# Patient Record
Sex: FEMALE | Race: WHITE
[De-identification: names, ages, dates, MRNs, and addresses within clinical notes are randomized per-mention and may not be internally consistent; named-entity substitution may affect disease eponyms.]

---

## 2017-03-11 ENCOUNTER — HOSPITAL ENCOUNTER (EMERGENCY)
Dept: HOSPITAL 84 - D.ER | Age: 82
Discharge: HOME | End: 2017-03-11
Payer: MEDICARE

## 2017-03-11 VITALS — BODY MASS INDEX: 21.7 KG/M2

## 2017-03-11 DIAGNOSIS — Z86.73: ICD-10-CM

## 2017-03-11 DIAGNOSIS — R41.82: Primary | ICD-10-CM

## 2017-03-11 LAB
ANION GAP SERPL CALC-SCNC: 15.3 MMOL/L (ref 8–16)
APPEARANCE UR: CLEAR
BACTERIA #/AREA URNS HPF: (no result) /HPF
BASOPHILS NFR BLD AUTO: 0.1 % (ref 0–2)
BILIRUB SERPL-MCNC: NEGATIVE MG/DL
BUN SERPL-MCNC: 17 MG/DL (ref 7–18)
CALCIUM SERPL-MCNC: 9.2 MG/DL (ref 8.5–10.1)
CHLORIDE SERPL-SCNC: 106 MMOL/L (ref 98–107)
CO2 SERPL-SCNC: 25.2 MMOL/L (ref 21–32)
COLOR UR: YELLOW
CREAT SERPL-MCNC: 0.9 MG/DL (ref 0.6–1.3)
EOSINOPHIL NFR BLD: 14.3 % (ref 0–7)
ERYTHROCYTE [DISTWIDTH] IN BLOOD BY AUTOMATED COUNT: 13.7 % (ref 11.5–14.5)
GLUCOSE SERPL-MCNC: 125 MG/DL (ref 74–106)
GLUCOSE SERPL-MCNC: NEGATIVE MG/DL
HCT VFR BLD CALC: 38.1 % (ref 36–48)
HGB BLD-MCNC: 12.3 G/DL (ref 12–16)
IMM GRANULOCYTES NFR BLD: 0.4 % (ref 0–5)
KETONES UR STRIP-MCNC: NEGATIVE MG/DL
LEUKOCYTE ESTERASE: (no result)
LYMPHOCYTES NFR BLD AUTO: 12 % (ref 15–50)
MCH RBC QN AUTO: 31.3 PG (ref 26–34)
MCHC RBC AUTO-ENTMCNC: 32.3 G/DL (ref 31–37)
MCV RBC: 96.9 FL (ref 80–100)
MONOCYTES NFR BLD: 7.5 % (ref 2–11)
NEUTROPHILS NFR BLD AUTO: 65.7 % (ref 40–80)
NITRITE UR-MCNC: NEGATIVE MG/ML
OSMOLALITY SERPL CALC.SUM OF ELEC: 287 MOSM/KG (ref 275–300)
PH UR STRIP: 7 [PH] (ref 5–6)
PLATELET # BLD: 226 10X3/UL (ref 130–400)
PMV BLD AUTO: 10.2 FL (ref 7.4–10.4)
POTASSIUM SERPL-SCNC: 3.5 MMOL/L (ref 3.5–5.1)
PROT UR-MCNC: NEGATIVE MG/DL
RBC # BLD AUTO: 3.93 10X6/UL (ref 4–5.4)
RBC #/AREA URNS HPF: (no result) /HPF (ref 0–5)
SODIUM SERPL-SCNC: 143 MMOL/L (ref 136–145)
SP GR UR STRIP: 1.01 (ref 1–1.02)
SQUAMOUS #/AREA URNS HPF: (no result) /HPF (ref 0–5)
UROBILINOGEN UR-MCNC: NORMAL MG/DL
WBC # BLD AUTO: 12.2 10X3/UL (ref 4.8–10.8)
WBC #/AREA URNS HPF: (no result) /HPF (ref 0–5)

## 2017-03-13 ENCOUNTER — HOSPITAL ENCOUNTER (INPATIENT)
Dept: HOSPITAL 84 - D.ER | Age: 82
LOS: 4 days | Discharge: NURSING, REHAB OR LONG-TERM CARE | DRG: 552 | End: 2017-03-17
Attending: FAMILY MEDICINE | Admitting: FAMILY MEDICINE
Payer: MEDICARE

## 2017-03-13 VITALS
BODY MASS INDEX: 22.18 KG/M2 | WEIGHT: 141.3 LBS | HEIGHT: 67 IN | BODY MASS INDEX: 22.18 KG/M2 | HEIGHT: 67 IN | BODY MASS INDEX: 22.18 KG/M2 | WEIGHT: 141.3 LBS

## 2017-03-13 VITALS — SYSTOLIC BLOOD PRESSURE: 161 MMHG | DIASTOLIC BLOOD PRESSURE: 89 MMHG

## 2017-03-13 VITALS — DIASTOLIC BLOOD PRESSURE: 50 MMHG | SYSTOLIC BLOOD PRESSURE: 116 MMHG

## 2017-03-13 VITALS — SYSTOLIC BLOOD PRESSURE: 116 MMHG | DIASTOLIC BLOOD PRESSURE: 50 MMHG

## 2017-03-13 DIAGNOSIS — I25.10: ICD-10-CM

## 2017-03-13 DIAGNOSIS — I42.9: ICD-10-CM

## 2017-03-13 DIAGNOSIS — M54.16: ICD-10-CM

## 2017-03-13 DIAGNOSIS — I11.0: ICD-10-CM

## 2017-03-13 DIAGNOSIS — F03.90: ICD-10-CM

## 2017-03-13 DIAGNOSIS — Z98.61: ICD-10-CM
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
## 2017-03-13 DIAGNOSIS — S32.059A: Primary | ICD-10-CM

## 2017-03-13 DIAGNOSIS — Z95.811: ICD-10-CM

## 2017-03-13 DIAGNOSIS — I50.9: ICD-10-CM

## 2017-03-13 LAB
ALBUMIN SERPL-MCNC: 3.1 G/DL (ref 3.4–5)
ALP SERPL-CCNC: 76 U/L (ref 46–116)
ALT SERPL-CCNC: 20 U/L (ref 10–68)
ANION GAP SERPL CALC-SCNC: 12.9 MMOL/L (ref 8–16)
BASOPHILS NFR BLD AUTO: 0.3 % (ref 0–2)
BILIRUB SERPL-MCNC: 0.83 MG/DL (ref 0.2–1.3)
BUN SERPL-MCNC: 12 MG/DL (ref 7–18)
CALCIUM SERPL-MCNC: 9.1 MG/DL (ref 8.5–10.1)
CHLORIDE SERPL-SCNC: 106 MMOL/L (ref 98–107)
CK SERPL-CCNC: 123 UL (ref 21–215)
CO2 SERPL-SCNC: 26.9 MMOL/L (ref 21–32)
CREAT SERPL-MCNC: 0.9 MG/DL (ref 0.6–1.3)
EOSINOPHIL NFR BLD: 14.4 % (ref 0–7)
ERYTHROCYTE [DISTWIDTH] IN BLOOD BY AUTOMATED COUNT: 13.3 % (ref 11.5–14.5)
GLOBULIN SER-MCNC: 3.9 G/L
GLUCOSE SERPL-MCNC: 103 MG/DL (ref 74–106)
HCT VFR BLD CALC: 35.7 % (ref 36–48)
HGB BLD-MCNC: 11.7 G/DL (ref 12–16)
IMM GRANULOCYTES NFR BLD: 0.2 % (ref 0–5)
LYMPHOCYTES NFR BLD AUTO: 17.7 % (ref 15–50)
MCH RBC QN AUTO: 31.3 PG (ref 26–34)
MCHC RBC AUTO-ENTMCNC: 32.8 G/DL (ref 31–37)
MCV RBC: 95.5 FL (ref 80–100)
MONOCYTES NFR BLD: 7.3 % (ref 2–11)
NEUTROPHILS NFR BLD AUTO: 60.1 % (ref 40–80)
NT-PROBNP SERPL-MCNC: 1021 PG/ML (ref 0–450)
OSMOLALITY SERPL CALC.SUM OF ELEC: 282 MOSM/KG (ref 275–300)
PLATELET # BLD: 197 10X3/UL (ref 130–400)
PMV BLD AUTO: 10 FL (ref 7.4–10.4)
POTASSIUM SERPL-SCNC: 3.8 MMOL/L (ref 3.5–5.1)
PROT SERPL-MCNC: 7 G/DL (ref 6.4–8.2)
RBC # BLD AUTO: 3.74 10X6/UL (ref 4–5.4)
SODIUM SERPL-SCNC: 142 MMOL/L (ref 136–145)
TROPONIN I SERPL-MCNC: 0.06 NG/ML (ref 0–0.06)
WBC # BLD AUTO: 6.5 10X3/UL (ref 4.8–10.8)

## 2017-03-13 NOTE — NUR
REPORT RECIVED FROM FELICIA MCKEON IN ER.  PT TO BE RECIVED TO ROOM 2140 AND PT TO
HAVE MRI COMPLETED ONCE ARRIVED TO FLOOR REPORTED TO THIS NURSE THAT IV 18G TO
LEFT AC AND 1000MG TYLENOL GIVEN IN ER FOR PAIN.  WILL ASSESS PT ONCE ARRIVES
TO FLOOR

## 2017-03-13 NOTE — NUR
RECEIVED REPORT, IV-LAC-ISMA, [id number], BED IS LOW, SRX2, EXPLAINED HOW TO
USE CALL LIGHT, CALL LIGHT IN REACH, WILL CONTINUE TO MONITOR

## 2017-03-13 NOTE — NUR
PT ARRIVED FROM ER TO ROOM 2140 BY WHEELCHAIR FAMILY IN ROOM AT BEDSIDE HX
COMPLETED AND MEDICATIONS REVIEWED FAMILY REPORTED THAT PT " HAS NOT TAKEN ANY
OF HER MEDICATIONS IN ABOUT A MOUNTH OR SO AND I DONT KNOW WHAT SHE IS ALL ON
AMOXICILLIN SHE TOOK YESTERDAY AND THAT WAS THE LAST TIME SHE TOOK ANY MEDS"
YUNIER FOX NOTIFIED OF PT ARRIVAL AND PT INFORMED THAT SHE IS TO BE TAKEN TO
MRI  RESPERATIONS EVEN AND UNLABORED WITH NO DISTRESS OBSERVED BED LOW AND
LOKCED CALL LIGHT IN REACH SRX 2 BED ALARM IN AND WORKING WILL MONITOR

## 2017-03-14 VITALS — DIASTOLIC BLOOD PRESSURE: 73 MMHG | SYSTOLIC BLOOD PRESSURE: 121 MMHG

## 2017-03-14 VITALS — SYSTOLIC BLOOD PRESSURE: 139 MMHG | DIASTOLIC BLOOD PRESSURE: 63 MMHG

## 2017-03-14 VITALS — SYSTOLIC BLOOD PRESSURE: 136 MMHG | DIASTOLIC BLOOD PRESSURE: 64 MMHG

## 2017-03-14 VITALS — SYSTOLIC BLOOD PRESSURE: 159 MMHG | DIASTOLIC BLOOD PRESSURE: 73 MMHG

## 2017-03-14 VITALS — DIASTOLIC BLOOD PRESSURE: 70 MMHG | SYSTOLIC BLOOD PRESSURE: 132 MMHG

## 2017-03-14 LAB
ALBUMIN SERPL-MCNC: 3 G/DL (ref 3.4–5)
ALP SERPL-CCNC: 68 U/L (ref 46–116)
ALT SERPL-CCNC: 11 U/L (ref 10–68)
ANION GAP SERPL CALC-SCNC: 11.7 MMOL/L (ref 8–16)
BASOPHILS NFR BLD AUTO: 0.3 % (ref 0–2)
BILIRUB SERPL-MCNC: 0.78 MG/DL (ref 0.2–1.3)
BUN SERPL-MCNC: 12 MG/DL (ref 7–18)
CALCIUM SERPL-MCNC: 8.7 MG/DL (ref 8.5–10.1)
CHLORIDE SERPL-SCNC: 106 MMOL/L (ref 98–107)
CO2 SERPL-SCNC: 25.8 MMOL/L (ref 21–32)
CREAT SERPL-MCNC: 0.7 MG/DL (ref 0.6–1.3)
EOSINOPHIL NFR BLD: 7.6 % (ref 0–7)
ERYTHROCYTE [DISTWIDTH] IN BLOOD BY AUTOMATED COUNT: 13.1 % (ref 11.5–14.5)
GLOBULIN SER-MCNC: 3.7 G/L
GLUCOSE SERPL-MCNC: 97 MG/DL (ref 74–106)
HCT VFR BLD CALC: 35.6 % (ref 36–48)
HGB BLD-MCNC: 11.5 G/DL (ref 12–16)
IMM GRANULOCYTES NFR BLD: 0.1 % (ref 0–5)
LYMPHOCYTES NFR BLD AUTO: 20.8 % (ref 15–50)
MCH RBC QN AUTO: 30.7 PG (ref 26–34)
MCHC RBC AUTO-ENTMCNC: 32.3 G/DL (ref 31–37)
MCV RBC: 94.9 FL (ref 80–100)
MONOCYTES NFR BLD: 9.2 % (ref 2–11)
NEUTROPHILS NFR BLD AUTO: 62 % (ref 40–80)
OSMOLALITY SERPL CALC.SUM OF ELEC: 278 MOSM/KG (ref 275–300)
PLATELET # BLD: 202 10X3/UL (ref 130–400)
PMV BLD AUTO: 11.1 FL (ref 7.4–10.4)
POTASSIUM SERPL-SCNC: 3.5 MMOL/L (ref 3.5–5.1)
PROT SERPL-MCNC: 6.7 G/DL (ref 6.4–8.2)
RBC # BLD AUTO: 3.75 10X6/UL (ref 4–5.4)
SODIUM SERPL-SCNC: 140 MMOL/L (ref 136–145)
WBC # BLD AUTO: 6.9 10X3/UL (ref 4.8–10.8)

## 2017-03-14 NOTE — NUR
ASSIST TO BEDSIDE COMMODE, PLACED BACK IN BED, CALL LIGHT IN REACH, BED ALARM
ON, WILL CONTINUE TO MONITOR

## 2017-03-14 NOTE — NUR
PT RESTING IN BED. DAUGHTER AT BEDSIDE. ALERT/ORIENTED WITH SOME INTERMITENT
CONFUSION/FORGETFULNESS. SALINE LOCK TO LEFT A/C. SR PER TELEMETRY. FALL
PRECAUTIONS/BED ALARM ON. SEE COMPLETED SHIFT ASSESSMENT. CPOC.

## 2017-03-14 NOTE — HP
PATIENT: KEITH TOMAS                                   MEDICAL RECORD: [id number]
ACCOUNT: [id number]                                    LOCATION:[facility]    D.2140
: 31                                            ADMISSION DATE: 17
                                                         
 
                             HISTORY AND PHYSICAL EXAMINATION
 
 
HISTORY OF PRESENT ILLNESS:  Ms. Tomas is an 85-year-old patient, who have
healthy connections are presents after having a fall.  She lives alone with her
sister.  She denies any tripping or dizziness.  She is wearing a LifeVest, which
apparently fired.  She is accompanied by her daughter today.  Dr. Rose is
her cardiologist.  She is complaining of low back pain, worse since her fall.
 
PAST MEDICAL HISTORY:  Significant for dementia, myocardial infarction in ____
year.  Since that time, she has been wearing a LifeVest.  She apparently had
some arrhythmias afterwards, also has hypertension.
 
PREVIOUS SURGERIES:  Include gallbladder.
 
ALLERGIES:  None known.
 
HOME MEDICATIONS:  She was taking some amoxicillin recently.
 
FAMILY HISTORY:  Noncontributory.
 
SOCIAL HISTORY:  She is not a smoker.  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
in November.  She has been staying with her sister here in [place].
 
REVIEW OF SYSTEMS:  She has had some memory issues, some weakness in her recent
fall.  She denies any chest pain, any dizziness, any palpitations.  She denies
any shortness of breath.  She denies any change in bladder or bowel function. 
She is complaining of back pain.
 
PHYSICAL EXAMINATION:
HEENT:  Head is normocephalic, sclerae nonicteric.
NECK:  Soft and supple.
HEART:  Regular.
LUNGS:  Clear.
ABDOMEN:  Soft.  No rebound, no guarding, no mass.
NEUROLOGIC:  Without any gross focal deficits.
 
IMPRESSIONS:
1.  Apparent firing of LifeVest.
2.  Low back pain, worrisome for compression fracture, dementia and coronary
artery disease with myocardial infarction approximately 4 months ago, cardiac
arrhythmia.
 
PLAN:  Place her on telemetry.  We will get MRI of lumbar spine.  Consult
cardiology.  LifeVest, people have already been here to interrogate Vest and
were still waiting on what that showed.  Telemetry to see orders for rest of the
plan.
 
TRANSINT:[id number] Voice Confirmation ID: [id number] DOCUMENT ID: [id number]
 
 
 
 
 
HISTORY AND PHYSICAL                           [id number]    KEITH TOMAS MATTHEW DO            
 
 
 
Electronically Signed by ELISE LARSEN on 17 at 1523
 
 
 
 
 
 
 
 
 
 
 
 
 
 
 
 
 
 
 
 
 
 
 
 
 
 
 
 
 
 
 
 
 
 
 
 
 
 
 
 
 
CC:                                                             [id number]
DICTATION DATE: 17 164     :     17 1908      ADM IN  
                                                                              
[facility]                                          
[address] [id number]

## 2017-03-14 NOTE — NUR
FREQUENTLY IN ROOM TO RESET BED ALARM SINCE PT IS PRONE TO SIT ON SIDE OF BED
AND "JUST BE". CLOSE SUPERVISION.

## 2017-03-15 VITALS — SYSTOLIC BLOOD PRESSURE: 110 MMHG | DIASTOLIC BLOOD PRESSURE: 62 MMHG

## 2017-03-15 VITALS — SYSTOLIC BLOOD PRESSURE: 102 MMHG | DIASTOLIC BLOOD PRESSURE: 71 MMHG

## 2017-03-15 VITALS — SYSTOLIC BLOOD PRESSURE: 110 MMHG | DIASTOLIC BLOOD PRESSURE: 58 MMHG

## 2017-03-15 VITALS — DIASTOLIC BLOOD PRESSURE: 69 MMHG | SYSTOLIC BLOOD PRESSURE: 117 MMHG

## 2017-03-15 VITALS — DIASTOLIC BLOOD PRESSURE: 84 MMHG | SYSTOLIC BLOOD PRESSURE: 152 MMHG

## 2017-03-15 VITALS — SYSTOLIC BLOOD PRESSURE: 111 MMHG | DIASTOLIC BLOOD PRESSURE: 69 MMHG

## 2017-03-15 LAB
ALBUMIN SERPL-MCNC: 3 G/DL (ref 3.4–5)
ALP SERPL-CCNC: 73 U/L (ref 46–116)
ALT SERPL-CCNC: 16 U/L (ref 10–68)
ANION GAP SERPL CALC-SCNC: 12.7 MMOL/L (ref 8–16)
BASOPHILS NFR BLD AUTO: 0.1 % (ref 0–2)
BILIRUB SERPL-MCNC: 0.51 MG/DL (ref 0.2–1.3)
BUN SERPL-MCNC: 13 MG/DL (ref 7–18)
CALCIUM SERPL-MCNC: 8.8 MG/DL (ref 8.5–10.1)
CHLORIDE SERPL-SCNC: 107 MMOL/L (ref 98–107)
CO2 SERPL-SCNC: 25.8 MMOL/L (ref 21–32)
CREAT SERPL-MCNC: 0.7 MG/DL (ref 0.6–1.3)
EOSINOPHIL NFR BLD: 8.1 % (ref 0–7)
ERYTHROCYTE [DISTWIDTH] IN BLOOD BY AUTOMATED COUNT: 13.4 % (ref 11.5–14.5)
GLOBULIN SER-MCNC: 3.6 G/L
GLUCOSE SERPL-MCNC: 112 MG/DL (ref 74–106)
HCT VFR BLD CALC: 34.6 % (ref 36–48)
HGB BLD-MCNC: 11.3 G/DL (ref 12–16)
IMM GRANULOCYTES NFR BLD: 0.1 % (ref 0–5)
LYMPHOCYTES NFR BLD AUTO: 20.4 % (ref 15–50)
MCH RBC QN AUTO: 30.9 PG (ref 26–34)
MCHC RBC AUTO-ENTMCNC: 32.7 G/DL (ref 31–37)
MCV RBC: 94.5 FL (ref 80–100)
MONOCYTES NFR BLD: 12.9 % (ref 2–11)
NEUTROPHILS NFR BLD AUTO: 58.4 % (ref 40–80)
OSMOLALITY SERPL CALC.SUM OF ELEC: 283 MOSM/KG (ref 275–300)
PLATELET # BLD: 206 10X3/UL (ref 130–400)
PMV BLD AUTO: 10.4 FL (ref 7.4–10.4)
POTASSIUM SERPL-SCNC: 3.5 MMOL/L (ref 3.5–5.1)
PROT SERPL-MCNC: 6.6 G/DL (ref 6.4–8.2)
RBC # BLD AUTO: 3.66 10X6/UL (ref 4–5.4)
SODIUM SERPL-SCNC: 142 MMOL/L (ref 136–145)
WBC # BLD AUTO: 7.1 10X3/UL (ref 4.8–10.8)

## 2017-03-15 NOTE — NUR
DISCONTINUED PT'S ORDERED MORPHINE AT THIS TIME DUE TO FAMILY STATES PT
BECOMES VERY CONFUSED/AGITATED/RESTLESS AND DIFFICULT TO HANDLE WHEN GIVEN
MORPHINE. LISTED NOW AS ADR FOR PATIENT.

## 2017-03-15 NOTE — NUR
PT GEORGE/GINGER TOMAS CALLED TO  ADVISE NOT TO PROCEED WITH BACK SURGERY AND OK TO
SEND TO REHAB. I ADVISED I WOULD RELAY THIS INFORMATION TO YUNIER/MARK WITH
DR. PHILLIPS. SHE STATED SHE COULD BE REACHED [phone] IF NEEDED FOR
FURTHER QUESTIONS.

## 2017-03-15 NOTE — NUR
Given Tylenol with codeine tab by another clinician as per MD orders for back
pain rated 10/10.  Patient states she fell 2 weeks ago and hurt her back.
Also given Melatonin 3mg po PRN for sleep.

## 2017-03-15 NOTE — NUR
Received patient sitting up in bed visiting with daughter at bedside, bed
alarm on, Left AC PIV is SL, telemetry monitor on, rhythm is SR with
occasional PVCs.   Oriented to person and place at this time, somewhat
anxious.   Will check for antianxiety medication available.

## 2017-03-15 NOTE — NUR
ASSISTED UP TO USE BEDSIDE COMMODE. PT VOIDED THEN ASSISTED BACK TO BED. PT
HAS BEEN AWAKE MOST OF THE NIGHT, YET TELLS NURSE "WELL I SURE HAVE BEEN
ASLEEP ALOT". VERY POOR SHORT TERM RECALL.

## 2017-03-16 VITALS — SYSTOLIC BLOOD PRESSURE: 116 MMHG | DIASTOLIC BLOOD PRESSURE: 68 MMHG

## 2017-03-16 VITALS — DIASTOLIC BLOOD PRESSURE: 69 MMHG | SYSTOLIC BLOOD PRESSURE: 107 MMHG

## 2017-03-16 VITALS — DIASTOLIC BLOOD PRESSURE: 70 MMHG | SYSTOLIC BLOOD PRESSURE: 109 MMHG

## 2017-03-16 VITALS — DIASTOLIC BLOOD PRESSURE: 73 MMHG | SYSTOLIC BLOOD PRESSURE: 114 MMHG

## 2017-03-16 VITALS — SYSTOLIC BLOOD PRESSURE: 113 MMHG | DIASTOLIC BLOOD PRESSURE: 46 MMHG

## 2017-03-16 VITALS — DIASTOLIC BLOOD PRESSURE: 67 MMHG | SYSTOLIC BLOOD PRESSURE: 153 MMHG

## 2017-03-16 LAB
ALBUMIN SERPL-MCNC: 2.8 G/DL (ref 3.4–5)
ALP SERPL-CCNC: 69 U/L (ref 46–116)
ALT SERPL-CCNC: 16 U/L (ref 10–68)
ANION GAP SERPL CALC-SCNC: 11.7 MMOL/L (ref 8–16)
BASOPHILS NFR BLD AUTO: 0.1 % (ref 0–2)
BILIRUB SERPL-MCNC: 0.5 MG/DL (ref 0.2–1.3)
BUN SERPL-MCNC: 17 MG/DL (ref 7–18)
CALCIUM SERPL-MCNC: 8.4 MG/DL (ref 8.5–10.1)
CHLORIDE SERPL-SCNC: 104 MMOL/L (ref 98–107)
CO2 SERPL-SCNC: 26.6 MMOL/L (ref 21–32)
CREAT SERPL-MCNC: 0.8 MG/DL (ref 0.6–1.3)
EOSINOPHIL NFR BLD: 11.9 % (ref 0–7)
ERYTHROCYTE [DISTWIDTH] IN BLOOD BY AUTOMATED COUNT: 13.5 % (ref 11.5–14.5)
GLOBULIN SER-MCNC: 3.3 G/L
GLUCOSE SERPL-MCNC: 109 MG/DL (ref 74–106)
HCT VFR BLD CALC: 33.3 % (ref 36–48)
HGB BLD-MCNC: 10.9 G/DL (ref 12–16)
IMM GRANULOCYTES NFR BLD: 0.1 % (ref 0–5)
LYMPHOCYTES NFR BLD AUTO: 23.8 % (ref 15–50)
MCH RBC QN AUTO: 31.1 PG (ref 26–34)
MCHC RBC AUTO-ENTMCNC: 32.7 G/DL (ref 31–37)
MCV RBC: 94.9 FL (ref 80–100)
MONOCYTES NFR BLD: 11.3 % (ref 2–11)
NEUTROPHILS NFR BLD AUTO: 52.8 % (ref 40–80)
OSMOLALITY SERPL CALC.SUM OF ELEC: 280 MOSM/KG (ref 275–300)
PLATELET # BLD: 224 10X3/UL (ref 130–400)
PMV BLD AUTO: 10.3 FL (ref 7.4–10.4)
POTASSIUM SERPL-SCNC: 3.3 MMOL/L (ref 3.5–5.1)
PROT SERPL-MCNC: 6.1 G/DL (ref 6.4–8.2)
RBC # BLD AUTO: 3.51 10X6/UL (ref 4–5.4)
SODIUM SERPL-SCNC: 139 MMOL/L (ref 136–145)
WBC # BLD AUTO: 6.8 10X3/UL (ref 4.8–10.8)

## 2017-03-16 NOTE — NUR
SPOKE WITH PATIENT, HER DAUGHTER, AND DR. PHILLIPS. PATIENT'S DAUGHTER WANTS HER
TO GO TO [facility] IN [place]. SHE STATES IT IS CLOSE TO HER
HOME AND IT WOULD BE EASIER FOR HER THAN HERE IN [place]. I PHONED ACUTE
REHAB AT [facility] [phone]. I HAVE FAXED HER INFORMATION TO [facility]
[phone]. WILL AWAIT RESPONSE.
I HAVE LET THE PATIENT AND HER DAUGHTER KNOW OF THE REFERRAL AND WILL LET THEM
KNOW WHEN WE HEAR BACK FROM THEM. CM TO FOLLOW.

## 2017-03-16 NOTE — NUR
PATIENT IS AWAKE AND ALERT, SHE RATES PAIN 5/10 ONLY IF SHE MOVES, BUT SHE
SAYS SHE IS OK RIGHT NOW.

## 2017-03-16 NOTE — NUR
Is the patient Alert and Oriented? Yes  0
* How many steps to enter\exit or inside your home? 5  0
* PCP DR. VOGEL IN [place]  0
* Pharmacy [facility]  0
* Preadmission Environment Home with Family  0
* ADLs Independent  0
* Equipment Walker  0
* List name and contact numbers for known caregivers / representatives who
currently or will assist patient after discharge: DAUGHTER: GINGER TOMAS
[phone]
SISTER: RUSLAN MEEK [phone]  0
* Community resources currently utilized None  0
* Additional services required to return to the preadmission environment? No
0
* Can the patient safely return to the preadmission environment? Yes  0
* Has this patient been hospitalized within the prior 30 days at any hospital?
No
PATIENT IS AWAKE AND ALERT. SHE STATES SHE LIVES AT HOME WITH HER SISTER, RUSLAN MEEK. SHE STATES THAT HER MD IS DR. VOGEL IN [place]. PATIENT GETS HER
MEDS FROM [facility]. SHE TELLS ME SHE HAS A WALKER SHE UTILIZES AT
HOME. PATIENT DENIES HOME HEALTH CARE. PATIENT UNSURE ABOUT SOME OF HER
ANSWERS AND STATES I NEED TO TALK TO HER DAUGHTER LATER THIS AFTERNOON.
PATIENT MAY BENEFIT FROM ACUTE REHAB. SHE STATES I SHOULD TALK TO HER DAUGHTER
ABOUT THAT.
PATIENT WAS GIVEN IMM AND EXPLAINED. PATIENT SIGNED AND STATED SHE WILL GIVE
IT TO HER DAUGHTER LATER TODAY. CM TO FOLLOW.

## 2017-03-16 NOTE — NUR
Given Tylenol #3 given for severe back pain and generalized aches and pains,
rated 10/10.  Extremely anxious, hyperventillating, having panic attack,
talked with patient for verbal 1:1.  Encouraged to take deep breaths and calm
behavior.  Assisted back into bed, had been sitting up.  Will continue to
monitor.

## 2017-03-16 NOTE — NUR
PRNs deemed effective, has been sleeping x 2 hours, respirations easy and
regular, no restlessness noted.

## 2017-03-16 NOTE — NUR
ASSISTED PATIENT TO BSC, SHE IS SOMEWHAT CONFUSED, BUT SHE DOES UNDERSTAND
ENOUGH NOT TO GET UP BY HERSELF DURING THE DAY.

## 2017-03-16 NOTE — NUR
WENT INTO PATIENT'S ROOM AND LOOKED AT THE COMPUTER SHE SAID "OH YOU CAN TURN
THAT OFF" BUT THEN SHE SAID "I DIDN'T WANT THE TV OFF". PATIENT HAS
INTERMITTANT CONFUSION.

## 2017-03-16 NOTE — NUR
RECEIVED MEDICATION LIST. PATIENT AND DAUGHTER SAY ONE OF THE MEDS GIVES HER
DIARRHEA, BUT SHE WAS PRESCRIBED BENTYL, UNSURE OF DOSE OR TIMES.

## 2017-03-17 VITALS — SYSTOLIC BLOOD PRESSURE: 138 MMHG | DIASTOLIC BLOOD PRESSURE: 77 MMHG

## 2017-03-17 VITALS — DIASTOLIC BLOOD PRESSURE: 64 MMHG | SYSTOLIC BLOOD PRESSURE: 129 MMHG

## 2017-03-17 VITALS — DIASTOLIC BLOOD PRESSURE: 69 MMHG | SYSTOLIC BLOOD PRESSURE: 123 MMHG

## 2017-03-17 VITALS — SYSTOLIC BLOOD PRESSURE: 128 MMHG | DIASTOLIC BLOOD PRESSURE: 70 MMHG

## 2017-03-17 LAB
ALBUMIN SERPL-MCNC: 2.8 G/DL (ref 3.4–5)
ALP SERPL-CCNC: 68 U/L (ref 46–116)
ALT SERPL-CCNC: 16 U/L (ref 10–68)
ANION GAP SERPL CALC-SCNC: 9.9 MMOL/L (ref 8–16)
APPEARANCE UR: CLEAR
BASOPHILS NFR BLD AUTO: 0.6 % (ref 0–2)
BILIRUB SERPL-MCNC: 0.39 MG/DL (ref 0.2–1.3)
BILIRUB SERPL-MCNC: NEGATIVE MG/DL
BUN SERPL-MCNC: 22 MG/DL (ref 7–18)
CALCIUM SERPL-MCNC: 8.4 MG/DL (ref 8.5–10.1)
CHLORIDE SERPL-SCNC: 107 MMOL/L (ref 98–107)
CO2 SERPL-SCNC: 28.1 MMOL/L (ref 21–32)
COLOR UR: YELLOW
CREAT SERPL-MCNC: 0.8 MG/DL (ref 0.6–1.3)
EOSINOPHIL NFR BLD: 16.4 % (ref 0–7)
ERYTHROCYTE [DISTWIDTH] IN BLOOD BY AUTOMATED COUNT: 13.8 % (ref 11.5–14.5)
GLOBULIN SER-MCNC: 3.4 G/L
GLUCOSE SERPL-MCNC: 98 MG/DL (ref 74–106)
GLUCOSE SERPL-MCNC: NEGATIVE MG/DL
HCT VFR BLD CALC: 32.8 % (ref 36–48)
HGB BLD-MCNC: 10.6 G/DL (ref 12–16)
IMM GRANULOCYTES NFR BLD: 0.1 % (ref 0–5)
KETONES UR STRIP-MCNC: NEGATIVE MG/DL
LEUKOCYTE ESTERASE: NEGATIVE
LYMPHOCYTES NFR BLD AUTO: 23.1 % (ref 15–50)
MCH RBC QN AUTO: 30.9 PG (ref 26–34)
MCHC RBC AUTO-ENTMCNC: 32.3 G/DL (ref 31–37)
MCV RBC: 95.6 FL (ref 80–100)
MONOCYTES NFR BLD: 11.2 % (ref 2–11)
NEUTROPHILS NFR BLD AUTO: 48.6 % (ref 40–80)
NITRITE UR-MCNC: NEGATIVE MG/ML
OSMOLALITY SERPL CALC.SUM OF ELEC: 283 MOSM/KG (ref 275–300)
PH UR STRIP: 5 [PH] (ref 5–6)
PLATELET # BLD: 234 10X3/UL (ref 130–400)
PMV BLD AUTO: 10.4 FL (ref 7.4–10.4)
POTASSIUM SERPL-SCNC: 4 MMOL/L (ref 3.5–5.1)
PROT SERPL-MCNC: 6.2 G/DL (ref 6.4–8.2)
PROT UR-MCNC: NEGATIVE MG/DL
RBC # BLD AUTO: 3.43 10X6/UL (ref 4–5.4)
SODIUM SERPL-SCNC: 141 MMOL/L (ref 136–145)
SP GR UR STRIP: 1.01 (ref 1–1.02)
UROBILINOGEN UR-MCNC: NORMAL MG/DL
WBC # BLD AUTO: 6.7 10X3/UL (ref 4.8–10.8)

## 2017-03-17 NOTE — NUR
3/17/2017 10:50 DCP: Discharge Planning Referral as sent yesterday to [facility] in [place] yesterday afternoon by GRISELDA Rosas (Not
[facility]). Rec'd call from Claritza with [facility] - they have accepted patient
& will transport via facility van today at 1300. Patient will transfer to a
skilled nursing bed. Nursing to call report to [phone], 2nd Floor.
Spoke with daughter, Shy Colbert via telephone. She is agreeable to POC. She
states she will meet patient at the facility to complete admission paperwork.
Answered questions.
 
[facility]
[address] [facility], [place]
[phone]
 
[place]
[phone] - Cell
[phone] - Fax

## 2017-03-17 NOTE — NUR
3/17/2017 10:50 DCP: Discharge Planning Referral as sent yesterday to [facility] in [place] yesterday afternoon by GRISELDA Rosas (Not
[facility]). Rec'd call from Claritza with [facility] - they have accepted patient
& will transport via facility van today at 1300. Patient will transfer to a
skilled nursing bed. Nursing to call report to [phone], 2nd Floor.
Spoke with daughter, Shy Colbert via telephone. She is agreeable to POC. She
states she will meet patient at the facility to complete admission paperwork.
Answered questions.
 
[facility] [facility]
[address] [facility], [place]
[phone]
 
[place]
[phone] - Cell
[phone] - Fax

## 2017-03-17 NOTE — NUR
PT D/C'D TO REHAB FACILITY VIA FACILITY TRANSPORTATION. CALLED PT'S DAUGHTER
GINGER AND NOTIFIED HER OF PT'S DEPARTURE. PT'S BELONGINGS WENT WITH PT.

## 2017-03-17 NOTE — NUR
ASSESSMENT DONE. PT SITTING UP ON SIDE OF BED, READING NEWS PAPER. A/O X3.
DENIES PAIN OR NEEDS AT THIS TIME. NO DISTRESS NOTED. CALL LIGHT WITH IN
REACH. WILL CONT. TO MONITOR.

## 2017-03-17 NOTE — NUR
NURSE WENT OVER D/C INSTRUCTIONS WITH PT. IV REMOVED, AND TELEMETRY REMOVED.
NURSE ASSITED PT WITH DRESSING, AND WITH PACKING BELONGINGS. PT TO D/C TO
[facility], AWAITING FACILITY TRANSPORTATION TO ARRIVE.

## 2017-03-17 NOTE — NUR
ASSISTED PT UP TO BEDSIDE COMMODE SEVERAL TIMES TO VOID. COLLECTED URINE AND
SENT TO LAB. WILL CONTNUE TO MONITOR.

## 2017-03-17 NOTE — NUR
REPORT CALLED TO [facility] @ [phone].
FACILITY TRANSPORTATION WILL PICK PT UP AT 1300.

## 2018-03-21 ENCOUNTER — HOSPITAL ENCOUNTER (INPATIENT)
Dept: HOSPITAL 84 - D.ER | Age: 83
LOS: 15 days | Discharge: NURSING, REHAB OR LONG-TERM CARE | DRG: 885 | End: 2018-04-05
Attending: PSYCHIATRY & NEUROLOGY | Admitting: PSYCHIATRY & NEUROLOGY
Payer: MEDICARE

## 2018-03-21 VITALS — BODY MASS INDEX: 26.4 KG/M2 | BODY MASS INDEX: 26.3 KG/M2 | WEIGHT: 135 LBS

## 2018-03-21 VITALS — SYSTOLIC BLOOD PRESSURE: 115 MMHG | DIASTOLIC BLOOD PRESSURE: 66 MMHG

## 2018-03-21 VITALS — SYSTOLIC BLOOD PRESSURE: 145 MMHG | DIASTOLIC BLOOD PRESSURE: 63 MMHG

## 2018-03-21 DIAGNOSIS — Z95.810: ICD-10-CM

## 2018-03-21 DIAGNOSIS — G30.1: ICD-10-CM

## 2018-03-21 DIAGNOSIS — E03.9: ICD-10-CM

## 2018-03-21 DIAGNOSIS — R45.851: ICD-10-CM

## 2018-03-21 DIAGNOSIS — I10: ICD-10-CM

## 2018-03-21 DIAGNOSIS — F02.81: ICD-10-CM

## 2018-03-21 DIAGNOSIS — D64.9: ICD-10-CM

## 2018-03-21 DIAGNOSIS — Z91.81: ICD-10-CM

## 2018-03-21 DIAGNOSIS — M19.90: ICD-10-CM

## 2018-03-21 DIAGNOSIS — M54.10: ICD-10-CM

## 2018-03-21 DIAGNOSIS — I25.2: ICD-10-CM

## 2018-03-21 DIAGNOSIS — F32.1: Primary | ICD-10-CM

## 2018-03-21 DIAGNOSIS — I25.10: ICD-10-CM

## 2018-03-21 DIAGNOSIS — E78.5: ICD-10-CM

## 2018-03-21 DIAGNOSIS — R26.9: ICD-10-CM

## 2018-03-21 LAB
ALBUMIN SERPL-MCNC: 2.5 G/DL (ref 3.4–5)
ALP SERPL-CCNC: 64 U/L (ref 46–116)
ALT SERPL-CCNC: 10 U/L (ref 10–68)
AMPHETAMINES UR QL SCN: NEGATIVE QUAL
ANION GAP SERPL CALC-SCNC: 13.2 MMOL/L (ref 8–16)
APPEARANCE UR: CLEAR
BARBITURATES UR QL SCN: NEGATIVE QUAL
BASOPHILS NFR BLD AUTO: 0.1 % (ref 0–2)
BENZODIAZ UR QL SCN: NEGATIVE QUAL
BILIRUB SERPL-MCNC: 0.57 MG/DL (ref 0.2–1.3)
BILIRUB SERPL-MCNC: NEGATIVE MG/DL
BUN SERPL-MCNC: 23 MG/DL (ref 7–18)
BZE UR QL SCN: NEGATIVE QUAL
CALCIUM SERPL-MCNC: 8.8 MG/DL (ref 8.5–10.1)
CANNABINOIDS UR QL SCN: NEGATIVE QUAL
CHLORIDE SERPL-SCNC: 99 MMOL/L (ref 98–107)
CHOLEST/HDLC SERPL: 2.5 RATIO (ref 2.3–4.1)
CO2 SERPL-SCNC: 25.5 MMOL/L (ref 21–32)
COLOR UR: YELLOW
CREAT SERPL-MCNC: 1 MG/DL (ref 0.6–1.3)
EOSINOPHIL NFR BLD: 1 % (ref 0–7)
ERYTHROCYTE [DISTWIDTH] IN BLOOD BY AUTOMATED COUNT: 14.6 % (ref 11.5–14.5)
EST. AVERAGE GLUCOSE BLD GHB EST-MCNC: 137 MG/DL (ref 74–154)
ETHANOL SERPL-MCNC: 3 MG/DL (ref 0–10)
GLOBULIN SER-MCNC: 4.8 G/L
GLUCOSE SERPL-MCNC: 108 MG/DL (ref 74–106)
GLUCOSE SERPL-MCNC: NEGATIVE MG/DL
HCT VFR BLD CALC: 31.3 % (ref 36–48)
HDLC SERPL-MCNC: 52 MG/DL (ref 32–96)
HGB BLD-MCNC: 9.9 G/DL (ref 12–16)
IMM GRANULOCYTES NFR BLD: 0.2 % (ref 0–5)
KETONES UR STRIP-MCNC: (no result) MG/DL
LDL-HDL RATIO: 1.1 RATIO (ref 1.5–3.5)
LDLC SERPL-MCNC: 59 MG/DL (ref 0–100)
LYMPHOCYTES NFR BLD AUTO: 16.3 % (ref 15–50)
MCH RBC QN AUTO: 27.3 PG (ref 26–34)
MCHC RBC AUTO-ENTMCNC: 31.6 G/DL (ref 31–37)
MCV RBC: 86.2 FL (ref 80–100)
MONOCYTES NFR BLD: 10.5 % (ref 2–11)
NEUTROPHILS NFR BLD AUTO: 71.9 % (ref 40–80)
NITRITE UR-MCNC: NEGATIVE MG/ML
OPIATES UR QL SCN: NEGATIVE QUAL
OSMOLALITY SERPL CALC.SUM OF ELEC: 272 MOSM/KG (ref 275–300)
PCP UR QL SCN: NEGATIVE QUAL
PH UR STRIP: 7 [PH] (ref 5–6)
PLATELET # BLD: 255 10X3/UL (ref 130–400)
PMV BLD AUTO: 9.9 FL (ref 7.4–10.4)
POTASSIUM SERPL-SCNC: 3.7 MMOL/L (ref 3.5–5.1)
PROT SERPL-MCNC: 7.3 G/DL (ref 6.4–8.2)
PROT UR-MCNC: NEGATIVE MG/DL
RBC # BLD AUTO: 3.63 10X6/UL (ref 4–5.4)
SODIUM SERPL-SCNC: 134 MMOL/L (ref 136–145)
SP GR UR STRIP: 1.01 (ref 1–1.02)
TRIGL SERPL-MCNC: 105 MG/DL (ref 30–200)
UROBILINOGEN UR-MCNC: 1 MG/DL
WBC # BLD AUTO: 10.6 10X3/UL (ref 4.8–10.8)

## 2018-03-22 VITALS — SYSTOLIC BLOOD PRESSURE: 127 MMHG | DIASTOLIC BLOOD PRESSURE: 61 MMHG

## 2018-03-22 VITALS — DIASTOLIC BLOOD PRESSURE: 63 MMHG | SYSTOLIC BLOOD PRESSURE: 145 MMHG

## 2018-03-22 VITALS — DIASTOLIC BLOOD PRESSURE: 59 MMHG | SYSTOLIC BLOOD PRESSURE: 121 MMHG

## 2018-03-23 VITALS — SYSTOLIC BLOOD PRESSURE: 130 MMHG | DIASTOLIC BLOOD PRESSURE: 70 MMHG

## 2018-03-23 VITALS — SYSTOLIC BLOOD PRESSURE: 98 MMHG | DIASTOLIC BLOOD PRESSURE: 49 MMHG

## 2018-03-24 VITALS — DIASTOLIC BLOOD PRESSURE: 58 MMHG | SYSTOLIC BLOOD PRESSURE: 106 MMHG

## 2018-03-24 VITALS — SYSTOLIC BLOOD PRESSURE: 139 MMHG | DIASTOLIC BLOOD PRESSURE: 73 MMHG

## 2018-03-25 VITALS — SYSTOLIC BLOOD PRESSURE: 112 MMHG | DIASTOLIC BLOOD PRESSURE: 66 MMHG

## 2018-03-25 VITALS — DIASTOLIC BLOOD PRESSURE: 60 MMHG | SYSTOLIC BLOOD PRESSURE: 114 MMHG

## 2018-03-26 VITALS — SYSTOLIC BLOOD PRESSURE: 110 MMHG | DIASTOLIC BLOOD PRESSURE: 55 MMHG

## 2018-03-26 VITALS — SYSTOLIC BLOOD PRESSURE: 91 MMHG | DIASTOLIC BLOOD PRESSURE: 50 MMHG

## 2018-03-27 VITALS — DIASTOLIC BLOOD PRESSURE: 51 MMHG | SYSTOLIC BLOOD PRESSURE: 106 MMHG

## 2018-03-27 VITALS — SYSTOLIC BLOOD PRESSURE: 127 MMHG | DIASTOLIC BLOOD PRESSURE: 63 MMHG

## 2018-03-28 VITALS — DIASTOLIC BLOOD PRESSURE: 88 MMHG | SYSTOLIC BLOOD PRESSURE: 141 MMHG

## 2018-03-28 VITALS — SYSTOLIC BLOOD PRESSURE: 100 MMHG | DIASTOLIC BLOOD PRESSURE: 58 MMHG

## 2018-03-29 VITALS — DIASTOLIC BLOOD PRESSURE: 51 MMHG | SYSTOLIC BLOOD PRESSURE: 103 MMHG

## 2018-03-29 VITALS — SYSTOLIC BLOOD PRESSURE: 125 MMHG | DIASTOLIC BLOOD PRESSURE: 71 MMHG

## 2018-03-30 VITALS — DIASTOLIC BLOOD PRESSURE: 64 MMHG | SYSTOLIC BLOOD PRESSURE: 138 MMHG

## 2018-03-30 VITALS — SYSTOLIC BLOOD PRESSURE: 110 MMHG | DIASTOLIC BLOOD PRESSURE: 60 MMHG

## 2018-03-31 VITALS — SYSTOLIC BLOOD PRESSURE: 128 MMHG | DIASTOLIC BLOOD PRESSURE: 56 MMHG

## 2018-03-31 VITALS — DIASTOLIC BLOOD PRESSURE: 59 MMHG | SYSTOLIC BLOOD PRESSURE: 125 MMHG

## 2018-04-01 VITALS — DIASTOLIC BLOOD PRESSURE: 73 MMHG | SYSTOLIC BLOOD PRESSURE: 139 MMHG

## 2018-04-01 VITALS — SYSTOLIC BLOOD PRESSURE: 107 MMHG | DIASTOLIC BLOOD PRESSURE: 66 MMHG

## 2018-04-02 VITALS — DIASTOLIC BLOOD PRESSURE: 45 MMHG | SYSTOLIC BLOOD PRESSURE: 84 MMHG

## 2018-04-02 VITALS — DIASTOLIC BLOOD PRESSURE: 54 MMHG | SYSTOLIC BLOOD PRESSURE: 94 MMHG

## 2018-04-03 VITALS — SYSTOLIC BLOOD PRESSURE: 120 MMHG | DIASTOLIC BLOOD PRESSURE: 69 MMHG

## 2018-04-03 VITALS — DIASTOLIC BLOOD PRESSURE: 55 MMHG | SYSTOLIC BLOOD PRESSURE: 126 MMHG

## 2018-04-04 VITALS — DIASTOLIC BLOOD PRESSURE: 78 MMHG | SYSTOLIC BLOOD PRESSURE: 138 MMHG

## 2018-04-04 VITALS — SYSTOLIC BLOOD PRESSURE: 106 MMHG | DIASTOLIC BLOOD PRESSURE: 60 MMHG

## 2018-04-05 VITALS — SYSTOLIC BLOOD PRESSURE: 121 MMHG | DIASTOLIC BLOOD PRESSURE: 75 MMHG

## 2024-08-22 NOTE — NUR
Called and spoke to patient who states that while she was on Omeprazole she would have headaches and dizziness. States she started back on Pantoprazole daily for 3 days ago and she does not have the headaches or dizziness at this time.     Patient is taking pepcid AC at night as well    States that she is still having nausea while on pantoprazole, but she has had more stress occurring as well.     Patient requesting new pantoprazole prescription at this time.    Given potassium supplement per electrolyte protocol for K+ = 3.3